# Patient Record
Sex: FEMALE
[De-identification: names, ages, dates, MRNs, and addresses within clinical notes are randomized per-mention and may not be internally consistent; named-entity substitution may affect disease eponyms.]

---

## 2017-09-11 ENCOUNTER — HOSPITAL ENCOUNTER (OUTPATIENT)
Dept: HOSPITAL 5 - SPVWC | Age: 77
Discharge: HOME | End: 2017-09-11
Payer: MEDICARE

## 2017-09-11 DIAGNOSIS — M85.88: ICD-10-CM

## 2017-09-11 DIAGNOSIS — Z12.31: Primary | ICD-10-CM

## 2017-09-11 PROCEDURE — 77080 DXA BONE DENSITY AXIAL: CPT

## 2017-09-11 PROCEDURE — 77067 SCR MAMMO BI INCL CAD: CPT

## 2017-09-11 PROCEDURE — G0202 SCR MAMMO BI INCL CAD: HCPCS

## 2017-09-11 NOTE — MAMMOGRAPHY REPORT
BILATERAL MAMMOGRAM:



FINDINGS:

The breast tissue is heterogeneously dense, which could obscure

detection of small masses (approximately 50%-75% glandular).  No mass, 

distortion, suspicious calcification, or skin change is seen. No 

significant changes when compared to exams dating back to 2015.



CAD was utilized.



IMPRESSION:

Negative mammogram.  There is no mammographic evidence of

malignancy.



RECOMMENDATION:

Follow-up per ACS guidelines.



BI-RADS CATEGORY:  1 = Negative



ACR BI-RADS MAMMOGRAPHIC CODES:

0 = Needs additional imaging evaluation; 1 = Negative; 2 = Benign; 3 = 

Probably benign; 4 = Suspicious; 5 = Malignant; 6 = Known biopsy-proven 

malignancy



COMMENT:

      1.   Dense breast tissue, i.e., adenosis, fibrocystic 

            changes, etc., may obscure an underlying neoplasm.

      2.   Approximately 10% of cancers are not detected with

            mammography.

      3.   A negative mammography report should not delay biopsy 

            if a clinically suspicious mass is present.



COMMENT:

Patient follow-up letters are generated in [employer].

## 2017-09-11 NOTE — MAMMOGRAPHY REPORT
BONE DENSITY STUDY:



DEFINITIONS:

  BMD     = Bone Mineral Density

  T-score = BMD related to mean peak bone mass of young adult

            (mean expressed in Standard Deviation)

  Z-score = Age matched BMD expressed in SD



World Health Organization (WHO) Diagnostic Criteria

  Normal             T-score > -1 SD

  Osteopenia      T-score between -1 and -2.4 SD

  Osteoporosis    T-score -2.5 SD or below



FINDINGS:

The weighted average BMD of lumbar spine L1-L4 is 0.887 with a

T-score of -1.5.



The weighted average BMD of the left hip is 0.787 with a T-score of 

-1.3.



Compared to prior exam in 2015 the overall findings of the left hip and 

spine have slightly worsened.



IMPRESSION:

The patient's average T-score is diagnostic for osteopenia and average

relative risk for fracture.



NOTE:

BMD is not the only risk factor for fracture; also consider

factors such as the patient's age, risk of falling, previous

osteoporotic fracture, family history of osteoporotic fractures, 

current smoker, and low body weight.



Cowart's triangle is a region of interest in femur, predominantly

of trabecular bone.  It is not a true anatomic site, and [facility] does not 

recommend its use clinically.

## 2019-02-27 ENCOUNTER — HOSPITAL ENCOUNTER (EMERGENCY)
Dept: HOSPITAL 5 - ED | Age: 79
Discharge: HOME | End: 2019-02-27
Payer: MEDICARE

## 2019-02-27 VITALS — SYSTOLIC BLOOD PRESSURE: 127 MMHG | DIASTOLIC BLOOD PRESSURE: 79 MMHG
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
## 2019-02-27 DIAGNOSIS — Z88.2: ICD-10-CM

## 2019-02-27 DIAGNOSIS — S01.81XA: Primary | ICD-10-CM

## 2019-02-27 DIAGNOSIS — Z90.49: ICD-10-CM

## 2019-02-27 DIAGNOSIS — E78.00: ICD-10-CM
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
## 2019-02-27 PROCEDURE — 99283 EMERGENCY DEPT VISIT LOW MDM: CPT

## 2019-02-27 PROCEDURE — 70450 CT HEAD/BRAIN W/O DYE: CPT

## 2019-02-27 NOTE — EMERGENCY DEPARTMENT REPORT
ED Head Trauma HPI





- General


Chief complaint: Fall


Stated complaint: INJURED FOREHEAD/FELL


Time Seen by Provider: 02/27/19 18:50


Source: patient


Mode of arrival: Ambulatory


Limitations: No Limitations





- History of Present Illness


Initial comments: 





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 

complaining of a frontal headache pain.  She sustained a fall, struck her head 

on a concrete surface surface resulting in bleeding .  The fall was unwitnessed 

by her .  She states she is not sure if she passed out presents to fall 

and head injury reports headache and dizziness.  No neck pain.  Reports no 

current visual disturbances, nausea, chest pain, palpitations, vomiting, 

tinnitus


MD Complaint: head injury


-: Gradual


Location: frontal


Loss of Consciousness: no





- Related Data


Allergies/Adverse reactions: 


                                    Allergies











Allergy/AdvReac Type Severity Reaction Status Date / Time


 


sulfamethoxazole Allergy  Hives Verified 02/27/19 16:58





[From Bactrim]     


 


trimethoprim [From Bactrim] Allergy  Hives Verified 02/27/19 16:58














ED Review of Systems


ROS: 


Stated complaint: INJURED FOREHEAD/FELL


Other details as noted in HPI





Constitutional: denies: chills, fever


Eyes: denies: eye pain, eye discharge, vision change


ENT: denies: ear pain, throat pain


Respiratory: denies: cough, shortness of breath, wheezing


Cardiovascular: denies: chest pain, palpitations


Endocrine: no symptoms reported


Gastrointestinal: denies: abdominal pain, nausea, diarrhea


Genitourinary: denies: urgency, dysuria, discharge


Musculoskeletal: denies: back pain, joint swelling, arthralgia


Skin: denies: rash, lesions


Neurological: denies: headache, weakness, paresthesias


Psychiatric: denies: anxiety, depression


Hematological/Lymphatic: denies: easy bleeding, easy bruising





ED Past Medical Hx





- Past Medical History


Additional medical history: high cholesterol, murmur due to rheumatic fever





- Surgical History


Hx Appendectomy: Yes


Additional Surgical History: tonsills, ovarian, shoulder





- Social History


Smoking Status: Never Smoker


Substance Use Type: None





ED Physical Exam





- General


Limitations: No Limitations


General appearance: alert, in no apparent distress





- Head


Head exam: Present: atraumatic, normocephalic





- Eye


Eye exam: Present: normal appearance, PERRL


Pupils: Present: normal accommodation





- ENT


ENT exam: Present: normal exam, normal orophraynx, mucous membranes moist





- Neck


Neck exam: Present: normal inspection, full ROM





- Respiratory


Respiratory exam: Present: normal lung sounds bilaterally.  Absent: respiratory 

distress, wheezes, rales, chest wall tenderness, accessory muscle use





- Cardiovascular


Cardiovascular Exam: Present: regular rate, normal rhythm.  Absent: systolic 

murmur, diastolic murmur, rubs, gallop





- GI/Abdominal


GI/Abdominal exam: Present: soft, normal bowel sounds.  Absent: distended, 

tenderness





- Extremities Exam


Extremities exam: Present: normal inspection, full ROM, normal capillary refill





- Back Exam


Back exam: Present: normal inspection.  Absent: CVA tenderness (R), CVA 

tenderness (L)





- Neurological Exam


Neurological exam: Present: alert, oriented X3





- Psychiatric


Psychiatric exam: Present: normal affect, normal mood





- Skin


Skin exam: Present: warm, dry, normal color.  Absent: intact (chair.  She has 

broken skin to the frontal region of her forehead and a stellate fashion and a 

hematoma below the injury site), rash





ED Course





                                   Vital Signs











  02/27/19





  17:00


 


Temperature 97.7 F


 


Pulse Rate 90


 


Respiratory 20





Rate 


 


Blood Pressure 127/79


 


O2 Sat by Pulse 97





Oximetry 














- Laceration /Wound Repair


  ** Head


Wound Location: head


Wound Length (cm): 2


Wound's Depth, Shape: irregular, stellate


Betadine Prep?: Yes


Wound Repaired With: Dermabond


Critical care attestation.: 


If time is entered above; I have spent that time in minutes in the direct care 

of this critically ill patient, excluding procedure time.








ED Disposition


Clinical Impression: 


 Head injury, Laceration of head





Disposition: DC-01 TO HOME OR SELFCARE


Is pt being admited?: No


Does the pt Need Aspirin: No


Condition: Stable


Instructions:  Skin Adhesive Care (ED), Concussion (ED), Minor Head Injury (ED)


Referrals: 


MISSAEL GREGORY PA [Primary Care Provider] - 3-5 Days

## 2019-02-27 NOTE — CAT SCAN REPORT
FINAL REPORT



EXAM:  CT HEAD/BRAIN WO CON



HISTORY:  headache 



TECHNIQUE:  2.5 millimeter axial images from the skullbase to the vertex. 



Comparison: None 



FINDINGS:  

Low attenuation in the subcortical and deep white matter of the cerebral hemispheres bilaterally is n
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 
disease.



There is no evidence of intracranial hemorrhage or mass effect.



The ventricles are normal size.



There is moderate to marked atherosclerotic vascular calcification of the internal carotid arteries a
nd vertebral arteries bilaterally at the skullbase.



The visualized portions of the orbits, paranasal and mastoid sinuses are notable for partial opacific
ation of the mastoid sinuses bilaterally.



There is no evidence of fracture.



There is evidence of a frontal scalp soft tissue injury with small collections of air with associated
 soft tissue swelling.



IMPRESSION:  

1. Frontal scalp soft tissue injury with small collections of air and soft tissue swelling.



2. No evidence of fracture.



3. No evidence of an acute intracranial process, intracranial hemorrhage or mass effect. 



4. Moderate to marked intracranial atherosclerotic vascular calcification.